# Patient Record
Sex: MALE | Race: BLACK OR AFRICAN AMERICAN | NOT HISPANIC OR LATINO | URBAN - METROPOLITAN AREA
[De-identification: names, ages, dates, MRNs, and addresses within clinical notes are randomized per-mention and may not be internally consistent; named-entity substitution may affect disease eponyms.]

---

## 2018-09-17 ENCOUNTER — EMERGENCY (EMERGENCY)
Facility: HOSPITAL | Age: 33
LOS: 1 days | Discharge: HOME | End: 2018-09-17
Attending: EMERGENCY MEDICINE
Payer: COMMERCIAL

## 2018-09-17 VITALS
OXYGEN SATURATION: 96 % | SYSTOLIC BLOOD PRESSURE: 125 MMHG | RESPIRATION RATE: 20 BRPM | HEART RATE: 73 BPM | TEMPERATURE: 100 F | DIASTOLIC BLOOD PRESSURE: 79 MMHG

## 2018-09-17 VITALS — WEIGHT: 169.98 LBS | HEIGHT: 65 IN

## 2018-09-17 PROCEDURE — 99283 EMERGENCY DEPT VISIT LOW MDM: CPT

## 2018-09-17 PROCEDURE — 99283 EMERGENCY DEPT VISIT LOW MDM: CPT | Mod: 25

## 2018-09-17 RX ORDER — AMOXICILLIN 250 MG/5ML
500 SUSPENSION, RECONSTITUTED, ORAL (ML) ORAL ONCE
Qty: 0 | Refills: 0 | Status: COMPLETED | OUTPATIENT
Start: 2018-09-17 | End: 2018-09-17

## 2018-09-17 RX ORDER — CIPROFLOXACIN AND DEXAMETHASONE 3; 1 MG/ML; MG/ML
2 SUSPENSION/ DROPS AURICULAR (OTIC) ONCE
Qty: 0 | Refills: 0 | Status: COMPLETED | OUTPATIENT
Start: 2018-09-17 | End: 2018-09-17

## 2018-09-17 RX ADMIN — CIPROFLOXACIN AND DEXAMETHASONE 2 DROP(S): 3; 1 SUSPENSION/ DROPS AURICULAR (OTIC) at 03:45

## 2018-09-17 RX ADMIN — Medication 500 MILLIGRAM(S): at 03:44

## 2018-09-17 NOTE — ED PROVIDER NOTE - PHYSICAL EXAMINATION
Const: Awake, alert and oriented. In no acute distress. Well appearing.  HEENT: NC/AT. Moist mucous membranes. Throat: Pharynx clear, uvula is midline, tongue symmetrical Ears: Right ear pain on palpation of tragus, ear canal edematous, TM not visualized  Left ear: TM clear, ear canal normal    Eyes: Conjunctiva pink, sclera white bilaterally, EOMI.  Neck:. Soft and supple. Full ROM without pain.  Cardiac:  +S1/S2. No murmurs.  Resp: Speaking in full sentences. No evidence of respiratory distress. No wheezes, rales or rhonchi.  Abd: Soft, non-tender, non-distended. Normal bowel sounds in all 4 quadrants. No guarding or rebound.  Back: Spine midline and non-tender. No CVAT.  Skin: No rashes, abrasions or lacerations.  Lymph: No cervical lymphadenopathy.  Neuro: Awake, alert & oriented x 3. Moves all extremities symmetrically.

## 2018-09-17 NOTE — ED PROVIDER NOTE - OBJECTIVE STATEMENT
Patient is a 32 y/o male c/o of right ear pain that onset tonight at 9 p.m. Patient states pain started when he got out of the shower. Patient is a 34 y/o male c/o of right ear pain that onset tonight at 9 p.m. Patient states pain started when he got out of the shower. Patient states describes ear as being "clogged". Patient denies recent fevers, URI. Patient denies sore throat, neck pain, dizziness, headache, rash, vomiting, diarrhea, dysuria.

## 2018-09-17 NOTE — ED PROVIDER NOTE - ATTENDING CONTRIBUTION TO CARE
I personally saw the patient with the PA, and completed the key components of the history and physical exam. I then discussed the management plan with the PA.   gen in nad resp clear cardiac no murmur abd sogft neuro no deficits + right OE abx gtt f/u pcp

## 2019-07-05 ENCOUNTER — EMERGENCY (EMERGENCY)
Facility: HOSPITAL | Age: 34
LOS: 1 days | Discharge: HOME | End: 2019-07-05
Attending: EMERGENCY MEDICINE
Payer: COMMERCIAL

## 2019-07-05 VITALS
RESPIRATION RATE: 18 BRPM | DIASTOLIC BLOOD PRESSURE: 45 MMHG | TEMPERATURE: 98 F | HEART RATE: 84 BPM | WEIGHT: 169.98 LBS | SYSTOLIC BLOOD PRESSURE: 98 MMHG | HEIGHT: 65 IN | OXYGEN SATURATION: 97 %

## 2019-07-05 VITALS
SYSTOLIC BLOOD PRESSURE: 111 MMHG | DIASTOLIC BLOOD PRESSURE: 75 MMHG | RESPIRATION RATE: 18 BRPM | HEART RATE: 73 BPM | TEMPERATURE: 98 F | OXYGEN SATURATION: 97 %

## 2019-07-05 PROCEDURE — 73130 X-RAY EXAM OF HAND: CPT | Mod: 26,RT

## 2019-07-05 PROCEDURE — 99283 EMERGENCY DEPT VISIT LOW MDM: CPT | Mod: 25

## 2019-07-05 PROCEDURE — 99284 EMERGENCY DEPT VISIT MOD MDM: CPT | Mod: 25

## 2019-07-05 PROCEDURE — 73110 X-RAY EXAM OF WRIST: CPT | Mod: 26,RT

## 2019-07-05 PROCEDURE — 29125 APPL SHORT ARM SPLINT STATIC: CPT

## 2019-07-05 PROCEDURE — 73110 X-RAY EXAM OF WRIST: CPT

## 2019-07-05 PROCEDURE — 73130 X-RAY EXAM OF HAND: CPT

## 2019-07-05 PROCEDURE — 29125 APPL SHORT ARM SPLINT STATIC: CPT | Mod: RT

## 2019-07-05 RX ORDER — IBUPROFEN 200 MG
600 TABLET ORAL ONCE
Refills: 0 | Status: COMPLETED | OUTPATIENT
Start: 2019-07-05 | End: 2019-07-05

## 2019-07-05 RX ADMIN — Medication 600 MILLIGRAM(S): at 16:44

## 2019-07-05 NOTE — ED PROVIDER NOTE - PHYSICAL EXAMINATION
MSK: TTP anatomical snuff box R hand. No gross bony deformity. + swelling dorsal aspect of R hand at 1st MCP joint. + supination / pronation R hand. Full ROM digits 1-5 R hand.

## 2019-07-05 NOTE — ED ADULT NURSE NOTE - NSIMPLEMENTINTERV_GEN_ALL_ED
Implemented All Fall Risk Interventions:  [place] to call system. Call bell, personal items and telephone within reach. Instruct patient to call for assistance. Room bathroom lighting operational. Non-slip footwear when patient is off stretcher. Physically safe environment: no spills, clutter or unnecessary equipment. Stretcher in lowest position, wheels locked, appropriate side rails in place. Provide visual cue, wrist band, yellow gown, etc. Monitor gait and stability. Monitor for mental status changes and reorient to person, place, and time. Review medications for side effects contributing to fall risk. Reinforce activity limits and safety measures with patient and family.

## 2019-07-05 NOTE — ED PROVIDER NOTE - PROGRESS NOTE DETAILS
PA note: No acute fx on xray. Will place in thumb spica splint given snuff box tenderness. Pt given follow up information for Dr. Preston. NSAIDS / RICE therapy.

## 2019-07-05 NOTE — ED PROVIDER NOTE - OBJECTIVE STATEMENT
Pt is a 34 y/o m, no PMH, presents to ED c/o R hand / wrist pain after being struck with a basketball. Pt states he was playing basketball this afternoon when he was struck in the hand by the ball. Pt states he developed swelling and pain to the hand immediately after the injury. Pt states the pain is worsened when he pronates the wrist. Pt has not taken any medication in attempt to alleviate his symptoms. Pt has no other complaints at this time. Pt denies cold digits, loss of function of hand, and paresthesias.

## 2019-07-05 NOTE — ED PROVIDER NOTE - ATTENDING CONTRIBUTION TO CARE
Alize: I performed a face to face bedside interview with patient regarding history of present illness, review of symptoms and past medical history. I completed an independent physical exam.  I have discussed patient's plan of care with advanced care provider.   I agree with note as stated above including HISTORY OF PRESENT ILLNESS, HIV, PAST MEDICAL/SURGICAL/FAMILY/SOCIAL HISTORY, ALLERGIES AND HOME MEDICATIONS, REVIEW OF SYSTEMS, PHYSICAL EXAM, MEDICAL DECISION MAKING and any PROGRESS NOTES during the time I functioned as the attending physician for this patient  unless otherwise noted. My brief assessment is as follows: pain to right proximal wrist near anatomic snuff box, neurovascularly intact, happened playing basektball, no other injury. right thumb spica, xray neg f/u ortho.

## 2023-03-22 NOTE — ED ADULT TRIAGE NOTE - MEANS OF ARRIVAL
[address]  coding opportunities       Chart reviewed, no opportunity found: CHART REVIEWED, NO OPPORTUNITY FOUND        Patients Insurance     Medicare Insurance: Medicare ambulatory

## 2023-08-25 NOTE — ED PROCEDURE NOTE - PROCEDURE NAME, MLM
Last office visit date: 4/26/22    Medication Refill Protocol Failed.  Failed criteria: see below. Sent to clinician to review.  next appt 2/7/24     SSRI (Selective Serotonin Reuptake Inhibitors) Adult Refill Protocol - 12 Month Protocol Failed 08/25/2023 08:54 AM   Protocol Details  Seen by prescribing provider or same department within the last 12 months or has a future appt in 3 months - IF FAILED PLEASE LOOK AT CHART REVIEW FOR LAST VISIT AND PROCEED ACCORDINGLY               Splint